# Patient Record
Sex: FEMALE | Race: BLACK OR AFRICAN AMERICAN | NOT HISPANIC OR LATINO
[De-identification: names, ages, dates, MRNs, and addresses within clinical notes are randomized per-mention and may not be internally consistent; named-entity substitution may affect disease eponyms.]

---

## 2022-12-19 PROBLEM — Z00.00 ENCOUNTER FOR PREVENTIVE HEALTH EXAMINATION: Status: ACTIVE | Noted: 2022-12-19

## 2023-01-05 ENCOUNTER — APPOINTMENT (OUTPATIENT)
Dept: NEUROSURGERY | Facility: CLINIC | Age: 63
End: 2023-01-05
Payer: MEDICARE

## 2023-01-05 VITALS
OXYGEN SATURATION: 99 % | SYSTOLIC BLOOD PRESSURE: 127 MMHG | RESPIRATION RATE: 17 BRPM | WEIGHT: 190 LBS | HEART RATE: 77 BPM | BODY MASS INDEX: 29.82 KG/M2 | DIASTOLIC BLOOD PRESSURE: 72 MMHG | HEIGHT: 67 IN

## 2023-01-05 VITALS — TEMPERATURE: 96.8 F

## 2023-01-05 PROCEDURE — 99203 OFFICE O/P NEW LOW 30 MIN: CPT

## 2023-01-19 NOTE — HISTORY OF PRESENT ILLNESS
[de-identified] : 63 yo right handed female had Covid 202 and since then been experiencing sudden headaches, nausea, fatigue, numbness, tingling of extremiteis body weakness, visual disturbances, imbalance gait , jerky body and head pain  Went to neurologist and ordered imaging that revealed arachnoid cyst\par \par Medications\par \par Protonix prn\par Xanax prn \par tylenol\par vitamin D 3\par \par \par PSH\par Fibroidectomy 1995\par Cervix and Uterus removed 2002\par Removal ovaries 2007\par Left partial thyroidectomy 2000\par Right eye retinal detachment 1979\par \par NKDA\par

## 2023-01-19 NOTE — ASSESSMENT
[FreeTextEntry1] : PLAN\par \par Assessed pt\par Reviewed images and compared 2013,2021, 2022 ( Benign Arachnoid cysts)\par Risks and Benefits discussed aware that by removing it may or may not resolve all her symptoms. Pt understands\par and will call and decide on a date to procedd\par \par \par I, Dr. Nick, personally performed the evaluation and management (E/M) services for this new patient. That E/M includes conducting the initial examination, assessing all conditions, and establishing the plan of care. Today, my ACP, Nicolette Yang, was here to observe my evaluation and management services for this patient to be followed going forward.\par \par

## 2023-01-19 NOTE — REVIEW OF SYSTEMS
[Poor Coordination] : poor coordination [Tingling] : tingling [Hypersensitivity] : hypersensitivity [Dizziness] : dizziness [Lightheadedness] : lightheadedness [Vertigo] : vertigo [Migraine Headache] : migraine headaches [Ataxia] : ataxia [Anxiety] : anxiety [Depression] : depression [Negative] : Respiratory [FreeTextEntry3] : 6th nerve cranial deficits

## 2023-01-19 NOTE — ADDENDUM
[FreeTextEntry1] : PATIENT:	Osiris Lincoln 				\par \par Thursday, January 5, 2023\par \par I saw Osiris in the office today.  We spent approximately 40 minutes discussing her care.  Osiris presents with diffuse constitutional symptoms including left-sided headache, gait difficulty, dizziness, and other vague complaints and failure to thrive, with MR and CT evidence of enlarging posterior parietal left-sided arachnoid cyst.\par \par Osiris really has no other major medical issues, although she has had a number of prior surgeries including a DIOGENES-BSO, thyroid surgery, and ophthalmic surgery.  She takes no other major medications.  She is accompanied by her siblings.  She is retired from [employer] and is currently living alone.  Osiris really has no other neurological complaints.  I did have the opportunity to review her MRI versus her recent CT scan from February of this year which shows a significantly enlarged arachnoid cyst over her left posterior parietal area with significant compression of the underlying cortex.  There are no other findings.\par \par I had a long discussion with Osiris regarding the best way to proceed.  While this normally represents a benign process, I do think, given its enlargement and her vague constitutional symptoms, that decompression could be considered.  I did initially suggest conservative management, but I think she is convinced that her symptoms are due to the enlarging fluid, and I’m hard pressed not to consider surgery.  We did review the risks, benefits, and alternatives to open surgical decompression.  I explained that while the risks are low, there are real risks involved, but that I do think overall the risk profile favors the intervention rather than conservative management given her progressive symptomatology and her being convinced that the symptoms are, in fact, from the enlarging mass.\par \par I will be setting up her surgery with medical clearance in the near future.\par \par \par \par Danyel Nick MD\par \par [facility] #[id number]\par

## 2023-01-19 NOTE — PHYSICAL EXAM
[Motor Tone] : muscle tone was normal in all four extremities [Motor Strength] : muscle strength was normal in all four extremities [No Visual Abnormalities] : no visible abnormailities [Outer Ear] : the ears and nose were normal in appearance [Neck Appearance] : the appearance of the neck was normal [FreeTextEntry5] : right eye diplopia

## 2023-09-05 ENCOUNTER — APPOINTMENT (OUTPATIENT)
Dept: CARDIOLOGY | Facility: CLINIC | Age: 63
End: 2023-09-05

## 2023-12-13 ENCOUNTER — NON-APPOINTMENT (OUTPATIENT)
Age: 63
End: 2023-12-13

## 2023-12-13 DIAGNOSIS — Z86.59 PERSONAL HISTORY OF OTHER MENTAL AND BEHAVIORAL DISORDERS: ICD-10-CM

## 2023-12-13 DIAGNOSIS — Z86.16 PERSONAL HISTORY OF COVID-19: ICD-10-CM

## 2023-12-14 ENCOUNTER — APPOINTMENT (OUTPATIENT)
Dept: CARDIOLOGY | Facility: CLINIC | Age: 63
End: 2023-12-14
Payer: MEDICARE

## 2023-12-14 ENCOUNTER — NON-APPOINTMENT (OUTPATIENT)
Age: 63
End: 2023-12-14

## 2023-12-14 VITALS
DIASTOLIC BLOOD PRESSURE: 80 MMHG | SYSTOLIC BLOOD PRESSURE: 124 MMHG | WEIGHT: 190 LBS | HEIGHT: 65 IN | BODY MASS INDEX: 31.65 KG/M2

## 2023-12-14 DIAGNOSIS — E04.1 NONTOXIC SINGLE THYROID NODULE: ICD-10-CM

## 2023-12-14 DIAGNOSIS — E78.00 PURE HYPERCHOLESTEROLEMIA, UNSPECIFIED: ICD-10-CM

## 2023-12-14 DIAGNOSIS — Z82.49 FAMILY HISTORY OF ISCHEMIC HEART DISEASE AND OTHER DISEASES OF THE CIRCULATORY SYSTEM: ICD-10-CM

## 2023-12-14 DIAGNOSIS — Z78.9 OTHER SPECIFIED HEALTH STATUS: ICD-10-CM

## 2023-12-14 DIAGNOSIS — G93.0 CEREBRAL CYSTS: ICD-10-CM

## 2023-12-14 DIAGNOSIS — R07.9 CHEST PAIN, UNSPECIFIED: ICD-10-CM

## 2023-12-14 PROCEDURE — 99205 OFFICE O/P NEW HI 60 MIN: CPT

## 2023-12-14 PROCEDURE — 93000 ELECTROCARDIOGRAM COMPLETE: CPT

## 2023-12-14 RX ORDER — LEVOTHYROXINE SODIUM 75 UG/1
75 CAPSULE ORAL DAILY
Refills: 0 | Status: ACTIVE | COMMUNITY

## 2023-12-14 RX ORDER — METOPROLOL TARTRATE 25 MG/1
25 TABLET, FILM COATED ORAL TWICE DAILY
Refills: 0 | Status: ACTIVE | COMMUNITY

## 2023-12-14 RX ORDER — VALACYCLOVIR 500 MG/1
TABLET, FILM COATED ORAL
Refills: 0 | Status: ACTIVE | COMMUNITY

## 2023-12-14 NOTE — HISTORY OF PRESENT ILLNESS
[FreeTextEntry1] : Ms Lincoln comes for a second opinion. She has been seeing Dr Moss cardiology for chest pain.  She describes having at least 5 ER visits for chest discomfort recently.  Workup thus far unremarkable.   She was advised to have either a CT angiogram or cardiac catheterization.  She comes here for second opinion.  I have requested received and reviewed her records, we together reviewed the results of her testing including echo, stress test, monitors  First episode was February 2022.  Most recently she had discomfort yesterday.  This occurred at rest in bed it is localized, pinpoint under the left breast and towards the sternum.  It feels like a spasm.  It is intermittent, it can last the morning or the entire day.  This is nonexertional.  She has sporadic fluttering.  No dyspnea.  No syncope. She walks and has recently joined a gym.

## 2023-12-14 NOTE — CARDIOLOGY SUMMARY
[de-identified] : 12/14/23- nsr [de-identified] : Holter monitor 2022 [address] [facility] 7 PACs no A-fib [de-identified] : 4/17/23-normal LV function, ejection fraction greater than 55%, diastolic dysfunction,, mild TR, mildly thickened mitral valve, trace to mild mitral insufficiency, mild pulmonic insufficiency, small posterior pericardial effusion, hypermobile intra-atrial septum [de-identified] : Lexiscan sestamibi September 12, 2023-normal perfusion ejection fraction of 69%

## 2024-01-11 ENCOUNTER — RESULT REVIEW (OUTPATIENT)
Age: 64
End: 2024-01-11

## 2024-01-23 ENCOUNTER — RESULT REVIEW (OUTPATIENT)
Age: 64
End: 2024-01-23

## 2024-07-26 ENCOUNTER — NON-APPOINTMENT (OUTPATIENT)
Age: 64
End: 2024-07-26

## 2024-07-26 ENCOUNTER — APPOINTMENT (OUTPATIENT)
Dept: CARDIOLOGY | Facility: CLINIC | Age: 64
End: 2024-07-26
Payer: MEDICARE

## 2024-07-26 VITALS
HEIGHT: 65 IN | BODY MASS INDEX: 32.99 KG/M2 | WEIGHT: 198 LBS | SYSTOLIC BLOOD PRESSURE: 120 MMHG | DIASTOLIC BLOOD PRESSURE: 78 MMHG

## 2024-07-26 DIAGNOSIS — R93.1 ABNORMAL FINDINGS ON DIAGNOSTIC IMAGING OF HEART AND CORONARY CIRCULATION: ICD-10-CM

## 2024-07-26 DIAGNOSIS — R07.9 CHEST PAIN, UNSPECIFIED: ICD-10-CM

## 2024-07-26 DIAGNOSIS — E78.00 PURE HYPERCHOLESTEROLEMIA, UNSPECIFIED: ICD-10-CM

## 2024-07-26 PROCEDURE — 93000 ELECTROCARDIOGRAM COMPLETE: CPT

## 2024-07-26 PROCEDURE — 99215 OFFICE O/P EST HI 40 MIN: CPT

## 2024-07-26 RX ORDER — ASPIRIN 81 MG
81 TABLET, DELAYED RELEASE (ENTERIC COATED) ORAL DAILY
Refills: 0 | Status: DISCONTINUED | COMMUNITY
End: 2024-07-26

## 2024-07-26 NOTE — HISTORY OF PRESENT ILLNESS
[FreeTextEntry1] : Ms Lincoln comes for a second opinion. She has been seeing Dr Moss cardiology for chest pain.  She describes having at least 5 ER visits for chest discomfort recently.  Workup thus far unremarkable.   She was advised to have either a CT angiogram or cardiac catheterization.  She comes here for second opinion.  I have requested received and reviewed her records, we together reviewed the results of her testing including echo, stress test, monitors She continues to have pain under her breastbone in the central area and if she lays on her left side it is uncomfortable ,Non exertional, intermittent, positional. She gets a feeling her heart is on fire. No syncope, no palpitations. She walks and has recently joined a gym. She had a fall and ankle injury.  She feels that her bp and chest pain are related to her thyroid. She is working with an endocrinology NP through [facility].

## 2024-07-26 NOTE — CARDIOLOGY SUMMARY
[de-identified] : 7/26/24-- nsr low voltage  [de-identified] : Holter monitor 2022 [address] [facility] 7 PACs no A-fib [de-identified] : Lexiscan sestamibi September 12, 2023-normal perfusion ejection fraction of 69% [de-identified] : 4/17/23-normal LV function, ejection fraction greater than 55%, diastolic dysfunction,, mild TR, mildly thickened mitral valve, trace to mild mitral insufficiency, mild pulmonic insufficiency, small posterior pericardial effusion, hypermobile intra-atrial septum [de-identified] : January 23, 2024 CT angiogram, minimal LAD stenosis 52nd percentile calcium score in the LAD 0.5

## 2024-07-26 NOTE — CARDIOLOGY SUMMARY
[de-identified] : 7/26/24-- nsr low voltage  [de-identified] : Holter monitor 2022 [address] [facility] 7 PACs no A-fib [de-identified] : Lexiscan sestamibi September 12, 2023-normal perfusion ejection fraction of 69% [de-identified] : 4/17/23-normal LV function, ejection fraction greater than 55%, diastolic dysfunction,, mild TR, mildly thickened mitral valve, trace to mild mitral insufficiency, mild pulmonic insufficiency, small posterior pericardial effusion, hypermobile intra-atrial septum [de-identified] : January 23, 2024 CT angiogram, minimal LAD stenosis 52nd percentile calcium score in the LAD 0.5

## 2024-07-26 NOTE — HISTORY OF PRESENT ILLNESS
[FreeTextEntry1] : Ms Lincoln comes for a second opinion. She has been seeing Dr Moss cardiology for chest pain.  She describes having at least 5 ER visits for chest discomfort recently.  Workup thus far unremarkable.   She was advised to have either a CT angiogram or cardiac catheterization.  She comes here for second opinion.  I have requested received and reviewed her records, we together reviewed the results of her testing including echo, stress test, monitors She continues to have pain under her breastbone in the central area and if she lays on her left side it is uncomfortable ,Non exertional, intermittent, positional. She gets a feeling her heart is on fire. No syncope, no palpitations. She walks and has recently joined a gym. She had a fall and ankle injury.  She feels that her bp and chest pain are related to her thyroid. She is working with an endocrinology NP through [facility] [employer].

## 2024-08-06 ENCOUNTER — APPOINTMENT (OUTPATIENT)
Dept: CARDIOLOGY | Facility: CLINIC | Age: 64
End: 2024-08-06

## 2024-08-15 ENCOUNTER — APPOINTMENT (OUTPATIENT)
Dept: CARDIOLOGY | Facility: CLINIC | Age: 64
End: 2024-08-15
Payer: MEDICARE

## 2024-08-15 VITALS
HEIGHT: 65 IN | SYSTOLIC BLOOD PRESSURE: 102 MMHG | HEART RATE: 83 BPM | DIASTOLIC BLOOD PRESSURE: 68 MMHG | OXYGEN SATURATION: 99 % | WEIGHT: 202 LBS | BODY MASS INDEX: 33.66 KG/M2

## 2024-08-15 VITALS — DIASTOLIC BLOOD PRESSURE: 81 MMHG | SYSTOLIC BLOOD PRESSURE: 126 MMHG

## 2024-08-15 DIAGNOSIS — R07.9 CHEST PAIN, UNSPECIFIED: ICD-10-CM

## 2024-08-15 DIAGNOSIS — E78.00 PURE HYPERCHOLESTEROLEMIA, UNSPECIFIED: ICD-10-CM

## 2024-08-15 DIAGNOSIS — R93.1 ABNORMAL FINDINGS ON DIAGNOSTIC IMAGING OF HEART AND CORONARY CIRCULATION: ICD-10-CM

## 2024-08-15 PROCEDURE — 99214 OFFICE O/P EST MOD 30 MIN: CPT

## 2024-08-15 RX ORDER — LEVOTHYROXINE, LIOTHYRONINE 38; 9 UG/1; UG/1
60 TABLET ORAL
Refills: 0 | Status: ACTIVE | COMMUNITY
Start: 2024-08-15

## 2024-08-15 NOTE — CARDIOLOGY SUMMARY
[de-identified] : 7/26/24-- nsr low voltage  [de-identified] : Holter monitor 2022 [address] [facility] 7 PACs no A-fib [de-identified] : Lexiscan sestamibi September 12, 2023-normal perfusion ejection fraction of 69% [de-identified] : 4/17/23-normal LV function, ejection fraction greater than 55%, diastolic dysfunction,, mild TR, mildly thickened mitral valve, trace to mild mitral insufficiency, mild pulmonic insufficiency, small posterior pericardial effusion, hypermobile intra-atrial septum [de-identified] : January 23, 2024 CT angiogram, minimal LAD stenosis 52nd percentile calcium score in the LAD 0.5

## 2024-08-15 NOTE — HISTORY OF PRESENT ILLNESS
Yes
[FreeTextEntry1] : 64 year old female with minimal coronary calcification on cardiac CTA, dyslipidemia, family history of MI, anxiety, depression, s/p thyroid surgery.
[FreeTextEntry1] : 64 year old female with minimal coronary calcification on cardiac CTA, dyslipidemia, family history of MI, anxiety, depression, s/p thyroid surgery.

## 2024-08-15 NOTE — REASON FOR VISIT
[FreeTextEntry1] : Osiris Lincoln presents for follow up visit. 2 weeks after stopping metoprolol.   Ms. Lincoln brought her home BP machine and pulse oximeter for comparison.   She reports intermittent chest pain under her left breastbone at rest, when laying down and turning to her side. She denies exertional chest pain, SOB, palpitations, dizziness or syncope.   She says that she started seeing improvement in her symptoms after her thyroid medications were adjusted.

## 2024-08-15 NOTE — CARDIOLOGY SUMMARY
[de-identified] : 7/26/24-- nsr low voltage  [de-identified] : Holter monitor 2022 [address] [facility] 7 PACs no A-fib [de-identified] : Lexiscan sestamibi September 12, 2023-normal perfusion ejection fraction of 69% [de-identified] : 4/17/23-normal LV function, ejection fraction greater than 55%, diastolic dysfunction,, mild TR, mildly thickened mitral valve, trace to mild mitral insufficiency, mild pulmonic insufficiency, small posterior pericardial effusion, hypermobile intra-atrial septum [de-identified] : January 23, 2024 CT angiogram, minimal LAD stenosis 52nd percentile calcium score in the LAD 0.5

## 2024-08-15 NOTE — PHYSICAL EXAM
[No Acute Distress] : no acute distress [Normal S1, S2] : normal S1, S2 [No Murmur] : no murmur [No Rub] : no rub [Clear Lung Fields] : clear lung fields [Good Air Entry] : good air entry [No Respiratory Distress] : no respiratory distress  [Normal Gait] : normal gait [No Edema] : no edema [No Rash] : no rash [Moves all extremities] : moves all extremities [Normal Speech] : normal speech [Alert and Oriented] : alert and oriented

## 2024-08-15 NOTE — REVIEW OF SYSTEMS
[SOB] : no shortness of breath [Chest Discomfort] : chest discomfort [Lower Ext Edema] : no extremity edema [Palpitations] : no palpitations [Syncope] : no syncope [Dizziness] : no dizziness [Confusion] : no confusion was observed [Easy Bruising] : no tendency for easy bruising

## 2024-08-21 ENCOUNTER — APPOINTMENT (OUTPATIENT)
Dept: CARDIOLOGY | Facility: CLINIC | Age: 64
End: 2024-08-21

## 2024-10-18 ENCOUNTER — APPOINTMENT (OUTPATIENT)
Dept: ENDOCRINOLOGY | Facility: CLINIC | Age: 64
End: 2024-10-18
Payer: MEDICARE

## 2024-10-18 VITALS
WEIGHT: 199.5 LBS | HEART RATE: 66 BPM | BODY MASS INDEX: 33.24 KG/M2 | OXYGEN SATURATION: 97 % | HEIGHT: 65 IN | SYSTOLIC BLOOD PRESSURE: 114 MMHG | DIASTOLIC BLOOD PRESSURE: 68 MMHG

## 2024-10-18 DIAGNOSIS — E03.9 HYPOTHYROIDISM, UNSPECIFIED: ICD-10-CM

## 2024-10-18 PROCEDURE — 99205 OFFICE O/P NEW HI 60 MIN: CPT

## 2024-10-18 PROCEDURE — G2211 COMPLEX E/M VISIT ADD ON: CPT

## 2024-10-18 RX ORDER — LEVOTHYROXINE, LIOTHYRONINE 57; 13.5 UG/1; UG/1
90 TABLET ORAL DAILY
Refills: 0 | Status: ACTIVE | COMMUNITY

## 2025-04-29 ENCOUNTER — NON-APPOINTMENT (OUTPATIENT)
Age: 65
End: 2025-04-29

## 2025-04-30 ENCOUNTER — APPOINTMENT (OUTPATIENT)
Dept: CARDIOLOGY | Facility: CLINIC | Age: 65
End: 2025-04-30
Payer: MEDICARE

## 2025-04-30 ENCOUNTER — NON-APPOINTMENT (OUTPATIENT)
Age: 65
End: 2025-04-30

## 2025-04-30 VITALS
BODY MASS INDEX: 32.65 KG/M2 | DIASTOLIC BLOOD PRESSURE: 68 MMHG | SYSTOLIC BLOOD PRESSURE: 112 MMHG | HEART RATE: 74 BPM | OXYGEN SATURATION: 96 % | HEIGHT: 65 IN | WEIGHT: 196 LBS

## 2025-04-30 DIAGNOSIS — E78.00 PURE HYPERCHOLESTEROLEMIA, UNSPECIFIED: ICD-10-CM

## 2025-04-30 DIAGNOSIS — R07.9 CHEST PAIN, UNSPECIFIED: ICD-10-CM

## 2025-04-30 DIAGNOSIS — R93.1 ABNORMAL FINDINGS ON DIAGNOSTIC IMAGING OF HEART AND CORONARY CIRCULATION: ICD-10-CM

## 2025-04-30 PROCEDURE — 99215 OFFICE O/P EST HI 40 MIN: CPT

## 2025-04-30 PROCEDURE — 36415 COLL VENOUS BLD VENIPUNCTURE: CPT

## 2025-04-30 PROCEDURE — 93000 ELECTROCARDIOGRAM COMPLETE: CPT

## 2025-04-30 RX ORDER — IRON/IRON ASP GLY/FA/MV-MIN 38 125-25-1MG
TABLET ORAL
Refills: 0 | Status: ACTIVE | COMMUNITY

## 2025-04-30 RX ORDER — MAGNESIUM OXIDE/MAG AA CHELATE 300 MG
CAPSULE ORAL
Refills: 0 | Status: ACTIVE | COMMUNITY

## 2025-04-30 RX ORDER — ZINC SULFATE 50(220)MG
CAPSULE ORAL
Refills: 0 | Status: ACTIVE | COMMUNITY

## 2025-04-30 RX ORDER — EZETIMIBE 10 MG/1
10 TABLET ORAL
Qty: 90 | Refills: 2 | Status: ACTIVE | COMMUNITY
Start: 2025-04-30 | End: 1900-01-01

## 2025-04-30 RX ORDER — CALCIUM CARBONATE/VITAMIN D3 600 MG-10
TABLET ORAL
Refills: 0 | Status: ACTIVE | COMMUNITY

## 2025-05-01 LAB
CRP SERPL HS-MCNC: 0.78 MG/L
ERYTHROCYTE [SEDIMENTATION RATE] IN BLOOD BY WESTERGREN METHOD: 19 MM/HR

## 2025-05-02 DIAGNOSIS — E78.41 ELEVATED LIPOPROTEIN(A): ICD-10-CM

## 2025-05-02 LAB — APO LP(A) SERPL-MCNC: 136.8 NMOL/L

## 2025-05-06 RX ORDER — ASPIRIN 81 MG/1
81 TABLET, DELAYED RELEASE ORAL
Refills: 0 | Status: ACTIVE | COMMUNITY
Start: 2025-05-06

## 2025-05-20 ENCOUNTER — APPOINTMENT (OUTPATIENT)
Dept: CARDIOLOGY | Facility: CLINIC | Age: 65
End: 2025-05-20
Payer: MEDICARE

## 2025-05-20 VITALS
OXYGEN SATURATION: 96 % | BODY MASS INDEX: 32.65 KG/M2 | DIASTOLIC BLOOD PRESSURE: 70 MMHG | HEART RATE: 81 BPM | WEIGHT: 196 LBS | HEIGHT: 65 IN | SYSTOLIC BLOOD PRESSURE: 106 MMHG

## 2025-05-20 PROCEDURE — ZZZZZ: CPT | Mod: NC

## 2025-05-20 PROCEDURE — 93306 TTE W/DOPPLER COMPLETE: CPT | Mod: 59

## 2025-05-20 PROCEDURE — 93351 STRESS TTE COMPLETE: CPT

## 2025-06-02 ENCOUNTER — APPOINTMENT (OUTPATIENT)
Dept: CARDIOLOGY | Facility: CLINIC | Age: 65
End: 2025-06-02
Payer: MEDICARE

## 2025-06-02 PROCEDURE — 36415 COLL VENOUS BLD VENIPUNCTURE: CPT
